# Patient Record
Sex: FEMALE | Race: WHITE | NOT HISPANIC OR LATINO | Employment: FULL TIME | URBAN - METROPOLITAN AREA
[De-identification: names, ages, dates, MRNs, and addresses within clinical notes are randomized per-mention and may not be internally consistent; named-entity substitution may affect disease eponyms.]

---

## 2018-10-03 ENCOUNTER — OFFICE VISIT (OUTPATIENT)
Dept: INTERNAL MEDICINE | Facility: CLINIC | Age: 23
End: 2018-10-03
Payer: COMMERCIAL

## 2018-10-03 ENCOUNTER — LAB VISIT (OUTPATIENT)
Dept: LAB | Facility: HOSPITAL | Age: 23
End: 2018-10-03
Attending: FAMILY MEDICINE
Payer: COMMERCIAL

## 2018-10-03 VITALS
SYSTOLIC BLOOD PRESSURE: 128 MMHG | WEIGHT: 136.69 LBS | DIASTOLIC BLOOD PRESSURE: 84 MMHG | BODY MASS INDEX: 23.46 KG/M2 | OXYGEN SATURATION: 98 % | TEMPERATURE: 99 F | HEART RATE: 105 BPM

## 2018-10-03 DIAGNOSIS — F51.05 INSOMNIA SECONDARY TO ANXIETY: ICD-10-CM

## 2018-10-03 DIAGNOSIS — R19.7 INTERMITTENT DIARRHEA: ICD-10-CM

## 2018-10-03 DIAGNOSIS — K59.09 INTERMITTENT CONSTIPATION: Primary | ICD-10-CM

## 2018-10-03 DIAGNOSIS — K59.09 INTERMITTENT CONSTIPATION: ICD-10-CM

## 2018-10-03 DIAGNOSIS — R19.5 ABNORMAL STOOL COLOR: ICD-10-CM

## 2018-10-03 DIAGNOSIS — F41.9 INSOMNIA SECONDARY TO ANXIETY: ICD-10-CM

## 2018-10-03 PROBLEM — F32.A DEPRESSION: Status: RESOLVED | Noted: 2018-10-03 | Resolved: 2018-10-03

## 2018-10-03 PROBLEM — F90.9 ADHD (ATTENTION DEFICIT HYPERACTIVITY DISORDER): Status: ACTIVE | Noted: 2018-10-03

## 2018-10-03 PROBLEM — F32.A DEPRESSION: Status: ACTIVE | Noted: 2018-10-03

## 2018-10-03 PROBLEM — F90.9 ADHD (ATTENTION DEFICIT HYPERACTIVITY DISORDER): Status: RESOLVED | Noted: 2018-10-03 | Resolved: 2018-10-03

## 2018-10-03 LAB
ALBUMIN SERPL BCP-MCNC: 4.3 G/DL
ALP SERPL-CCNC: 78 U/L
ALT SERPL W/O P-5'-P-CCNC: 10 U/L
ANION GAP SERPL CALC-SCNC: 10 MMOL/L
AST SERPL-CCNC: 17 U/L
BASOPHILS # BLD AUTO: 0.03 K/UL
BASOPHILS NFR BLD: 0.6 %
BILIRUB SERPL-MCNC: 0.5 MG/DL
BUN SERPL-MCNC: 11 MG/DL
CALCIUM SERPL-MCNC: 10.1 MG/DL
CHLORIDE SERPL-SCNC: 107 MMOL/L
CO2 SERPL-SCNC: 24 MMOL/L
CREAT SERPL-MCNC: 0.8 MG/DL
DIFFERENTIAL METHOD: ABNORMAL
EOSINOPHIL # BLD AUTO: 0 K/UL
EOSINOPHIL NFR BLD: 0.7 %
ERYTHROCYTE [DISTWIDTH] IN BLOOD BY AUTOMATED COUNT: 12 %
EST. GFR  (AFRICAN AMERICAN): >60 ML/MIN/1.73 M^2
EST. GFR  (NON AFRICAN AMERICAN): >60 ML/MIN/1.73 M^2
GLUCOSE SERPL-MCNC: 60 MG/DL
HCT VFR BLD AUTO: 41 %
HGB BLD-MCNC: 13.6 G/DL
IMM GRANULOCYTES # BLD AUTO: 0.01 K/UL
IMM GRANULOCYTES NFR BLD AUTO: 0.2 %
LYMPHOCYTES # BLD AUTO: 1.6 K/UL
LYMPHOCYTES NFR BLD: 30 %
MCH RBC QN AUTO: 31.9 PG
MCHC RBC AUTO-ENTMCNC: 33.2 G/DL
MCV RBC AUTO: 96 FL
MONOCYTES # BLD AUTO: 0.3 K/UL
MONOCYTES NFR BLD: 6.3 %
NEUTROPHILS # BLD AUTO: 3.4 K/UL
NEUTROPHILS NFR BLD: 62.2 %
NRBC BLD-RTO: 0 /100 WBC
PLATELET # BLD AUTO: 241 K/UL
PMV BLD AUTO: 12.1 FL
POTASSIUM SERPL-SCNC: 4.7 MMOL/L
PROT SERPL-MCNC: 7.5 G/DL
RBC # BLD AUTO: 4.26 M/UL
SODIUM SERPL-SCNC: 141 MMOL/L
WBC # BLD AUTO: 5.44 K/UL

## 2018-10-03 PROCEDURE — 99999 PR PBB SHADOW E&M-EST. PATIENT-LVL III: CPT | Mod: PBBFAC,,, | Performed by: FAMILY MEDICINE

## 2018-10-03 PROCEDURE — 36415 COLL VENOUS BLD VENIPUNCTURE: CPT

## 2018-10-03 PROCEDURE — 3008F BODY MASS INDEX DOCD: CPT | Mod: CPTII,S$GLB,, | Performed by: FAMILY MEDICINE

## 2018-10-03 PROCEDURE — 99214 OFFICE O/P EST MOD 30 MIN: CPT | Mod: S$GLB,,, | Performed by: FAMILY MEDICINE

## 2018-10-03 PROCEDURE — 80053 COMPREHEN METABOLIC PANEL: CPT

## 2018-10-03 PROCEDURE — 85025 COMPLETE CBC W/AUTO DIFF WBC: CPT

## 2018-10-03 NOTE — PROGRESS NOTES
"Subjective:       Patient ID: Catherine Ordaz is a 23 y.o. female here today to establish care.    Chief Complaint: Establish Care; GI Problem; and Insomnia    Patient presents to clinic today to establish care. Patient reports stomach problems for 2 years that are stable. Reports she goes back and forth between constipation and diarrhea. She also reports concerning changes in stool color. She reports having bright green stools, portia colored stools and black stools at times. No red blood. She reports nausea at times, no vomiting. No fever/chills.       Review of Systems   Constitutional: Positive for unexpected weight change (15 pound weight gain over last 3-4  years). Negative for activity change.   HENT: Positive for hearing loss (left ear x 4 years). Negative for rhinorrhea and trouble swallowing.    Eyes: Negative for discharge and visual disturbance.   Respiratory: Negative for chest tightness and wheezing.    Cardiovascular: Negative for chest pain and palpitations.   Gastrointestinal: Positive for constipation and diarrhea. Negative for blood in stool and vomiting.   Endocrine: Negative for polydipsia and polyuria.   Genitourinary: Positive for menstrual problem. Negative for difficulty urinating, dysuria and hematuria.   Musculoskeletal: Positive for neck pain. Negative for arthralgias and joint swelling.   Neurological: Negative for weakness and headaches.   Psychiatric/Behavioral: Positive for confusion (reports will forget what she intends to do or means "fridge" but says "washer") and sleep disturbance (reports trouble sleeping). Negative for dysphoric mood. The patient is nervous/anxious (anxiety due to stress).        Objective:      Physical Exam   Constitutional: She is oriented to person, place, and time. She appears well-developed and well-nourished. No distress.   HENT:   Head: Normocephalic and atraumatic.   Eyes: Conjunctivae and EOM are normal. Pupils are equal, round, and reactive to " light. No scleral icterus.   Cardiovascular: Normal rate and regular rhythm. Exam reveals no gallop and no friction rub.   No murmur heard.  Pulmonary/Chest: Effort normal and breath sounds normal.   Abdominal: Soft. Bowel sounds are normal. She exhibits no distension and no mass. There is no hepatosplenomegaly. There is no tenderness.   Neurological: She is alert and oriented to person, place, and time. No cranial nerve deficit. Gait normal.   Psychiatric: She has a normal mood and affect.   Vitals reviewed.      Assessment:       1. Intermittent constipation    2. Intermittent diarrhea    3. Abnormal stool color    4. Insomnia secondary to anxiety        Plan:     Problem List Items Addressed This Visit     Insomnia secondary to anxiety    Current Assessment & Plan     Advised daily guided meditation to lower anxiety and stress levels; given handouts on stress management and insomnia; will reassess next visit and consider medication if needed           Other Visit Diagnoses     Intermittent constipation    -  Primary    Relevant Orders    Comprehensive metabolic panel    CBC auto differential    Ambulatory referral to Gastroenterology    Intermittent diarrhea        Relevant Orders    Comprehensive metabolic panel    CBC auto differential    Ambulatory referral to Gastroenterology    Abnormal stool color        Relevant Orders    Comprehensive metabolic panel    CBC auto differential    Ambulatory referral to Gastroenterology          Declines flu vaccine today. Will address other health maintenance at visit in 2 months.  Female health screenings per OB/GYN- Dr. Genet Hinds, Release signed for records.  .

## 2018-10-03 NOTE — ASSESSMENT & PLAN NOTE
Advised daily guided meditation to lower anxiety and stress levels; given handouts on stress management and insomnia; will reassess next visit and consider medication if needed

## 2018-10-03 NOTE — PATIENT INSTRUCTIONS
Try 3-5 minutes daily of guided meditation  Headspace - has free 10 day introduction  Simply Being  Calm  Simple Habit      Treating Insomnia  Good sleeping habits are a key part of treatment. If needed, some medications may help you sleep better at first. Making healthy lifestyle changes and learning to relax can improve your sleep. Treating insomnia takes commitment, but trust that your efforts will pay off. Talk to your health care provider before taking any medication.    Healthy Lifestyle  Your lifestyle affects your health and your sleep. Here are some healthy habits:  · Keep a regular sleep schedule. Go to bed and get up at the same time each day.  · Exercise regularly. It may help you reduce stress. Avoid strenuous exercise for two to four hours before bedtime.  · Avoid or limit naps.  · Use your bed only for sleep and sex.  · Dont spend too much time in bed trying to fall asleep. If you cant fall asleep, get up and do something until you become tired and drowsy.  · Avoid or limit caffeine and nicotine. They can keep you awake at night. Also avoid alcohol. It may help you fall asleep at first, but your sleep will not be restful.  Before Bedtime  To sleep better every night, try these tips:  · Have a bedtime routine to let your body and mind know when its time to sleep.  · Think of going to bed as relaxing and enjoyable. Sleep will come sooner.  · If your worries dont let you sleep, write them down in a diary. Then close it, and go to bed.  · Make sure the room is not too hot or too cold. If its not dark enough, an eye mask can help. If its noisy, try using earplugs.  Learn to Relax  Stress, anxiety, and body tension may keep you awake at night. To unwind before bedtime, try a warm bath, meditation, or yoga. Also, try the following:  · Deep breathing. Sit or lie back in a chair. Take a slow, deep breath. Hold it for 5 counts. Then breathe out slowly through your mouth. Keep doing this until you feel  "relaxed.  · Progressive muscle relaxation. Tense and then relax the muscles in your body as you breathe deeply. Start with your feet and work up your body to your neck and face.  © 2586-2872 Bert Kaur, [address] [place] [address]. All rights reserved. This information is not intended as a substitute for professional medical care. Always follow your healthcare professional's instructions.    Identifying Causes of Stress  Things that cause stress (stressors) can be everyday events, major life changes, or a combination of things. They can be either happy or sad events. Knowing your stressors will help you find ways to manage your stress.    Minor Hassles  Daily life is filled with little annoyances. Spilled milk, lost keys, a missed phone call. These are rarely earth-shattering events. But the stress they cause can build up over time. Minor hassles also seem more painful if you're under long-term stress.  Major Changes  A move, a divorce, or the loss of a loved one are major changes. They require you to adapt to a new lifestyle. You may fear an unknown future or worry about whether you'll be able to cope. Even positive events, like marriage or the birth of a baby, can cause major stress.    Stress Overload  Being pulled in many directions can be exhausting--especially when you're juggling work and family. Working late, taking kids to soccer, paying bills, and buying groceries may feel completely overwhelming. For a time, life can seem totally out of control.  Feeling Helpless  Feeling helpless is a sign of long-term stress. You may feel like you have no control over your life. Even a faraway disaster told on the evening news may seem like it's part of your own troubles. Over time, these feelings may lead to depression. If you feel "down" for weeks, talk with your doctor or a counselor. Depression can be treated.  © 5621-3416 Bert Kaur, [address] [place] [address]. All rights " reserved. This information is not intended as a substitute for professional medical care. Always follow your healthcare professional's instructions.  Keys to Managing Stress  There are several keys to managing stress. First, learn to recognize when youre under stress and what triggers it. Next, find positive ways of responding to your triggers. Be sure to take good care of your health and make time to relax. Read on to learn more about the keys to managing stress.  Recognizing Stress  Learn to recognize your stress and find out what triggers it. To do this, try to be aware of how you feel each day. If you notice your heart racing or your muscles tightening, your body may be responding to stress. Ask yourself why. Then write down your answer. To keep the process going, make a list of all the things that trigger stressful feelings.  Living a Healthy Life  Keeping yourself healthy helps you deal better with stress. This means getting enough sleep, eating right, and exercising. It also means knowing what you value most in life, and making time for yourself. Keep a daily health journal to see if youre doing these things. Then, read your journal each week. If you dont take good care of yourself, you may feel more stressed.    Responding Better to Stress  Life is full of stressors that you cant control. But you can learn more positive ways of responding to them. This will help you feel more in control. To begin, try this tip: Think about how much effort you want to put into dealing with a certain stressor. Do you really need to handle that stressor? If so, decide on the best way to do this. Change what you can. But if the stressor isnt important, or if its out of your control, then why worry about it?  Relaxing to Slow Down  Relaxing can help you prevent or relieve stressful feelings. This tip may also help: When youre facing a stressor, pause for a moment. Then take a deep breath and slowly breathe out as you count  to 10. This will help clear your mind so you can respond to stress better.  © 0367-4443 Bert Kaur, [address] [place] [address], [place] [address]. All rights reserved. This information is not intended as a substitute for professional medical care. Always follow your healthcare professional's instructions.

## 2018-10-12 ENCOUNTER — PATIENT MESSAGE (OUTPATIENT)
Dept: GASTROENTEROLOGY | Facility: CLINIC | Age: 23
End: 2018-10-12

## 2018-10-12 ENCOUNTER — OFFICE VISIT (OUTPATIENT)
Dept: GASTROENTEROLOGY | Facility: CLINIC | Age: 23
End: 2018-10-12
Payer: COMMERCIAL

## 2018-10-12 VITALS
DIASTOLIC BLOOD PRESSURE: 60 MMHG | SYSTOLIC BLOOD PRESSURE: 110 MMHG | BODY MASS INDEX: 24.06 KG/M2 | HEIGHT: 63 IN | WEIGHT: 135.81 LBS | HEART RATE: 64 BPM

## 2018-10-12 DIAGNOSIS — R11.0 NAUSEA: ICD-10-CM

## 2018-10-12 DIAGNOSIS — R19.5 ABNORMAL STOOL COLOR: ICD-10-CM

## 2018-10-12 DIAGNOSIS — R10.9 ABDOMINAL PAIN, UNSPECIFIED ABDOMINAL LOCATION: ICD-10-CM

## 2018-10-12 DIAGNOSIS — K59.04 CHRONIC IDIOPATHIC CONSTIPATION: Primary | ICD-10-CM

## 2018-10-12 PROCEDURE — 99204 OFFICE O/P NEW MOD 45 MIN: CPT | Mod: S$GLB,,, | Performed by: NURSE PRACTITIONER

## 2018-10-12 PROCEDURE — 99999 PR PBB SHADOW E&M-EST. PATIENT-LVL III: CPT | Mod: PBBFAC,,, | Performed by: NURSE PRACTITIONER

## 2018-10-12 PROCEDURE — 3008F BODY MASS INDEX DOCD: CPT | Mod: CPTII,S$GLB,, | Performed by: NURSE PRACTITIONER

## 2018-10-12 NOTE — PROGRESS NOTES
Clinic Consult:  [facility] Consultation Note    Reason for Consult:  The primary encounter diagnosis was Chronic idiopathic constipation. Diagnoses of Abnormal stool color, Abdominal pain, unspecified abdominal location, and Nausea were also pertinent to this visit.    PCP: Antonette Quinteros   [address] [place] [address]  / [place] [id number]    HPI:  This is a 23 y.o. female here for evaluation of the above. She was referred to me by Dr. Quinteros. She reports having chronic bowel issues for the last few years. She has constipation. She will sometimes go a couple of weeks without having a bowel movement. When she goes she states it just does not look normal. She admits hat the color of her stools vary in color and my be different colors. No hematochezia or melena. She has tried high fiber, Miralax, probiotic drinks. She denies any family history of colon cancer and IBD.     Review of Systems   Constitutional: Negative for fever, malaise/fatigue and weight loss.   HENT: Negative for sore throat.    Respiratory: Negative for cough and wheezing.    Cardiovascular: Negative for chest pain and palpitations.   Gastrointestinal: Positive for abdominal pain, constipation and diarrhea. Negative for blood in stool, heartburn, melena, nausea and vomiting.   Genitourinary: Negative for dysuria and frequency.   Musculoskeletal: Negative for back pain, joint pain, myalgias and neck pain.   Skin: Negative for itching and rash.   Neurological: Negative for dizziness, speech change, seizures, loss of consciousness and headaches.   Psychiatric/Behavioral: Negative for depression and substance abuse. The patient is not nervous/anxious.        Medical History:  has a past medical history of ADHD (attention deficit hyperactivity disorder) (10/3/2018), Anxiety, Depression, and H/O varicella.    Surgical History:  has no past surgical history on file.    Family History: family history includes No Known Problems in her father  "and mother..     Social History:  reports that she has quit smoking. she has never used smokeless tobacco. She reports that she drinks alcohol. She reports that she does not use drugs.    Allergies: Reviewed    Home Medications:   No current outpatient medications on file prior to visit.     No current facility-administered medications on file prior to visit.        Physical Exam:  /60   Pulse 64   Ht 5' 3" (1.6 m)   Wt 61.6 kg (135 lb 12.9 oz)   BMI 24.06 kg/m²   Body mass index is 24.06 kg/m².  Physical Exam   Constitutional: She is oriented to person, place, and time and well-developed, well-nourished, and in no distress. No distress.   HENT:   Head: Normocephalic.   Eyes: Conjunctivae are normal. Pupils are equal, round, and reactive to light.   Cardiovascular: Normal rate, regular rhythm and normal heart sounds.   Pulmonary/Chest: Effort normal and breath sounds normal. No respiratory distress.   Abdominal: Soft. Bowel sounds are normal. She exhibits no distension. There is no tenderness.   Neurological: She is alert and oriented to person, place, and time. No cranial nerve deficit.   Skin: Skin is warm and dry. No rash noted.   Psychiatric: Mood and affect normal.       Labs: Pertinent labs reviewed.    Assessment:  1. Chronic idiopathic constipation    2. Abnormal stool color    3. Abdominal pain, unspecified abdominal location    4. Nausea         Recommendations:  - start with initial clean out with magnesium citrate.  - then start Miralax daily   - start Probiotics     Follow-up if symptoms worsen or fail to improve.    Thank you so much for allowing me to participate in the care [facility]  "

## 2018-10-12 NOTE — LETTER
October 12, 2018      Antonette Quinteros MD  [address] [place] [address] [address] [place] [id number]           [facility]  [address] [place] [address] [address] [address]  Phone: [phone]  Fax: [phone]          Patient: Catherine Ordaz   MR Number: [id number]   YOB: 1995   Date of Visit: 10/12/2018       Dear Dr. Antonette Quinteros:    Thank you for referring Catherine Ordaz to me for evaluation. Attached you will find relevant portions of my assessment and plan of care.    If you have questions, please do not hesitate to call me. I look forward to following Catherine Ordaz along with you.    Sincerely,    Olivia Maravilla, NP    Enclosure  CC:  No Recipients    If you would like to receive this communication electronically, please contact [email or web address] or [phone] to request more information on [facility] access.    For providers and/or their staff who would like to refer a patient to [facility], please contact us through our one-stop-shop provider referral line, [facility] , at [phone].    If you feel you have received this communication in error or would no longer like to receive these types of communications, please e-mail [email or web address]

## 2018-10-12 NOTE — PATIENT INSTRUCTIONS
Start with an initial clean out with 1 bottle of Magnesium Citrate. This can be purchased over the counter. After completed, start Miralax once to twice a day. This can also be purchased over the counter. Also, you should drink plenty of water a day and incorporate dietary fiber in your diet, such as fruits, vegetables, and fortified cereals. Also start probiotics such as Culturelle or Florastor.

## 2019-03-07 ENCOUNTER — PATIENT MESSAGE (OUTPATIENT)
Dept: INTERNAL MEDICINE | Facility: CLINIC | Age: 24
End: 2019-03-07

## 2019-12-17 ENCOUNTER — PATIENT MESSAGE (OUTPATIENT)
Dept: INTERNAL MEDICINE | Facility: CLINIC | Age: 24
End: 2019-12-17

## 2019-12-30 ENCOUNTER — PATIENT MESSAGE (OUTPATIENT)
Dept: INTERNAL MEDICINE | Facility: CLINIC | Age: 24
End: 2019-12-30

## 2019-12-31 ENCOUNTER — OFFICE VISIT (OUTPATIENT)
Dept: INTERNAL MEDICINE | Facility: CLINIC | Age: 24
End: 2019-12-31
Payer: COMMERCIAL

## 2019-12-31 VITALS
DIASTOLIC BLOOD PRESSURE: 78 MMHG | HEART RATE: 98 BPM | OXYGEN SATURATION: 100 % | SYSTOLIC BLOOD PRESSURE: 126 MMHG | TEMPERATURE: 99 F | WEIGHT: 130.94 LBS | HEIGHT: 64 IN | BODY MASS INDEX: 22.35 KG/M2

## 2019-12-31 DIAGNOSIS — R05.9 COUGH: Primary | ICD-10-CM

## 2019-12-31 DIAGNOSIS — J06.9 VIRAL UPPER RESPIRATORY TRACT INFECTION: ICD-10-CM

## 2019-12-31 PROCEDURE — 90471 IMMUNIZATION ADMIN: CPT | Mod: S$GLB,,, | Performed by: FAMILY MEDICINE

## 2019-12-31 PROCEDURE — 96372 THER/PROPH/DIAG INJ SC/IM: CPT | Mod: 59,S$GLB,, | Performed by: FAMILY MEDICINE

## 2019-12-31 PROCEDURE — 90686 IIV4 VACC NO PRSV 0.5 ML IM: CPT | Mod: S$GLB,,, | Performed by: FAMILY MEDICINE

## 2019-12-31 PROCEDURE — 3008F PR BODY MASS INDEX (BMI) DOCUMENTED: ICD-10-PCS | Mod: CPTII,S$GLB,, | Performed by: FAMILY MEDICINE

## 2019-12-31 PROCEDURE — 96372 PR INJECTION,THERAP/PROPH/DIAG2ST, IM OR SUBCUT: ICD-10-PCS | Mod: 59,S$GLB,, | Performed by: FAMILY MEDICINE

## 2019-12-31 PROCEDURE — 3008F BODY MASS INDEX DOCD: CPT | Mod: CPTII,S$GLB,, | Performed by: FAMILY MEDICINE

## 2019-12-31 PROCEDURE — 90471 FLU VACCINE (QUAD) GREATER THAN OR EQUAL TO 3YO PRESERVATIVE FREE IM: ICD-10-PCS | Mod: S$GLB,,, | Performed by: FAMILY MEDICINE

## 2019-12-31 PROCEDURE — 90686 FLU VACCINE (QUAD) GREATER THAN OR EQUAL TO 3YO PRESERVATIVE FREE IM: ICD-10-PCS | Mod: S$GLB,,, | Performed by: FAMILY MEDICINE

## 2019-12-31 PROCEDURE — 99213 OFFICE O/P EST LOW 20 MIN: CPT | Mod: 25,S$GLB,, | Performed by: FAMILY MEDICINE

## 2019-12-31 PROCEDURE — 99999 PR PBB SHADOW E&M-EST. PATIENT-LVL III: CPT | Mod: PBBFAC,,, | Performed by: FAMILY MEDICINE

## 2019-12-31 PROCEDURE — 99999 PR PBB SHADOW E&M-EST. PATIENT-LVL III: ICD-10-PCS | Mod: PBBFAC,,, | Performed by: FAMILY MEDICINE

## 2019-12-31 PROCEDURE — 99213 PR OFFICE/OUTPT VISIT, EST, LEVL III, 20-29 MIN: ICD-10-PCS | Mod: 25,S$GLB,, | Performed by: FAMILY MEDICINE

## 2019-12-31 RX ORDER — BETAMETHASONE SODIUM PHOSPHATE AND BETAMETHASONE ACETATE 3; 3 MG/ML; MG/ML
9 INJECTION, SUSPENSION INTRA-ARTICULAR; INTRALESIONAL; INTRAMUSCULAR; SOFT TISSUE
Status: COMPLETED | OUTPATIENT
Start: 2019-12-31 | End: 2019-12-31

## 2019-12-31 RX ORDER — BENZONATATE 200 MG/1
200 CAPSULE ORAL 3 TIMES DAILY PRN
Qty: 30 CAPSULE | Refills: 1 | Status: SHIPPED | OUTPATIENT
Start: 2019-12-31 | End: 2020-01-10

## 2019-12-31 RX ADMIN — BETAMETHASONE SODIUM PHOSPHATE AND BETAMETHASONE ACETATE 9 MG: 3; 3 INJECTION, SUSPENSION INTRA-ARTICULAR; INTRALESIONAL; INTRAMUSCULAR; SOFT TISSUE at 09:12

## 2019-12-31 NOTE — PROGRESS NOTES
Catherine Ordaz  12/31/2019  [id number]    Antonette Quinteros MD  Patient Care Team:  Antonette Quinteros MD as PCP - General (Family Medicine)  Bianca Borges NP as Nurse Practitioner (Internal Medicine)  Genet Carpenter MD (Obstetrics and Gynecology)  Roxanna Betancur LPN as Care Coordinator (Internal Medicine)  Genet Carpenter MD (Obstetrics and Gynecology)        Chief Complaint:  Chief Complaint   Patient presents with    Cough    Sinus Problem       History of Present Illness:   Catherine Ordaz 24 y.o. female  states she had the flu about 2 weeks ago with cough fever chills body aches but was not treated by physician but rather took over-the-counter medicine to she felt better.    She says she has not had fever in over a week but continues to have a cough of yellowish-green mucus and some sneezing and sinus congestion.  No body aches no systemic symptoms and no fever recently.    History:  Past Medical History:   Diagnosis Date    ADHD (attention deficit hyperactivity disorder) 10/3/2018    Anxiety     Depression     H/O varicella     child     No past surgical history on file.  Family History   Problem Relation Age of Onset    No Known Problems Mother     No Known Problems Father      Social History     Socioeconomic History    Marital status: Single     Spouse name: Not on file    Number of children: Not on file    Years of education: Not on file    Highest education level: Not on file   Occupational History    Not on file   Social Needs    Financial resource strain: Not on file    Food insecurity:     Worry: Not on file     Inability: Not on file    Transportation needs:     Medical: Not on file     Non-medical: Not on file   Tobacco Use    Smoking status: Former Smoker    Smokeless tobacco: Never Used   Substance and Sexual Activity    Alcohol use: Yes     Alcohol/week: 0.0 standard drinks    Drug use: No    Sexual activity: Not on file   Lifestyle    Physical  activity:     Days per week: Not on file     Minutes per session: Not on file    Stress: Not on file   Relationships    Social connections:     Talks on phone: Not on file     Gets together: Not on file     Attends Caodaism service: Not on file     Active member of club or organization: Not on file     Attends meetings of clubs or organizations: Not on file     Relationship status: Not on file   Other Topics Concern    Not on file   Social History Narrative    Not on file     Patient Active Problem List   Diagnosis    Insomnia secondary to anxiety    Viral upper respiratory tract infection    Cough     Review of patient's allergies indicates:   Allergen Reactions    Aspirin Anxiety, Nausea Only and Shortness Of Breath       The following were reviewed at this visit: active problem list, medication list, allergies, family history, social history, and health maintenance.    Medications:  No current outpatient medications on file prior to visit.     No current facility-administered medications on file prior to visit.        Medications have been reviewed and reconciled with patient at this visit.      Exam:  Wt Readings from Last 3 Encounters:   12/31/19 59.4 kg (130 lb 15.3 oz)   10/12/18 61.6 kg (135 lb 12.9 oz)   10/03/18 62 kg (136 lb 11 oz)     Temp Readings from Last 3 Encounters:   12/31/19 98.5 °F (36.9 °C) (Tympanic)   10/03/18 99.3 °F (37.4 °C) (Tympanic)   04/15/16 100 °F (37.8 °C) (Tympanic)     BP Readings from Last 3 Encounters:   12/31/19 126/78   10/12/18 110/60   10/03/18 128/84     Pulse Readings from Last 3 Encounters:   12/31/19 98   10/12/18 64   10/03/18 105     Body mass index is 22.48 kg/m².      Review of Systems   Constitutional: Negative for chills, fever and weight loss.   Respiratory: Negative for shortness of breath.    Cardiovascular: Negative for chest pain and palpitations.     Physical Exam   Constitutional: She is oriented to person, place, and time. She appears  well-developed and well-nourished. No distress.   HENT:   Head: Normocephalic and atraumatic.   Right Ear: External ear normal.   Left Ear: External ear normal.   Heavy white and yellow postnasal drip noted in pharynx.    Neck supple    No palpable neck nodes   Eyes: Pupils are equal, round, and reactive to light. EOM are normal. Right eye exhibits no discharge. Left eye exhibits no discharge.   Neck: Normal range of motion. Neck supple. No thyromegaly present.   Cardiovascular: Normal rate, regular rhythm and normal heart sounds. Exam reveals no gallop and no friction rub.   No murmur heard.  Pulmonary/Chest: Effort normal and breath sounds normal. No respiratory distress. She has no wheezes. She has no rales.   Abdominal: Soft. She exhibits no distension. There is no tenderness.   Musculoskeletal: Normal range of motion.   Neurological: She is alert and oriented to person, place, and time.   Psychiatric: She has a normal mood and affect.   Vitals reviewed.    WNWD, A&O      Catherine was seen today for cough and sinus problem.    Diagnoses and all orders for this visit:    Cough    Viral upper respiratory tract infection    Other orders  -     betamethasone acetate-betamethasone sodium phosphate injection 9 mg  -     benzonatate (TESSALON) 200 MG capsule; Take 1 capsule (200 mg total) by mouth 3 (three) times daily as needed.     I have offered the patient a flu shot today since she has not had fever in over a week and does not have systemic symptoms.  She would like to receive the injection today.    The patient verbalized good understanding of the medical issues discussed today and expressed appreciation for the care provided.  Patient was given the opportunity to ask questions and be an active participant in their medical care. Patient had no further questions or concerns at this time.   The patient was encouraged to participate in appropriate physical activity.    After visit summary was printed and given to  patient upon discharge today.  Patient goals and care plan are included in After Visit Summary.    This note was produced with voice recognition software and may have sound a like errors

## 2019-12-31 NOTE — PATIENT INSTRUCTIONS
Cortisone injection today for sinus drainage and cough    Flu vaccine injection today    Consider Allegra over-the-counter daily for the next week or 2 to control postnasal drip sinus drainage and stay well hydrated    Take benzonatate up to 3 times a day to control cough

## 2021-01-18 ENCOUNTER — PATIENT MESSAGE (OUTPATIENT)
Dept: INTERNAL MEDICINE | Facility: CLINIC | Age: 26
End: 2021-01-18

## 2021-03-21 ENCOUNTER — PATIENT MESSAGE (OUTPATIENT)
Dept: INTERNAL MEDICINE | Facility: CLINIC | Age: 26
End: 2021-03-21

## 2021-03-22 ENCOUNTER — OFFICE VISIT (OUTPATIENT)
Dept: INTERNAL MEDICINE | Facility: CLINIC | Age: 26
End: 2021-03-22
Payer: COMMERCIAL

## 2021-03-22 ENCOUNTER — PATIENT MESSAGE (OUTPATIENT)
Dept: INTERNAL MEDICINE | Facility: CLINIC | Age: 26
End: 2021-03-22

## 2021-03-22 DIAGNOSIS — Z29.89 ALTITUDE SICKNESS PROPHYLAXIS: Primary | ICD-10-CM

## 2021-03-22 PROCEDURE — 99214 OFFICE O/P EST MOD 30 MIN: CPT | Mod: 95,,, | Performed by: FAMILY MEDICINE

## 2021-03-22 PROCEDURE — 99214 PR OFFICE/OUTPT VISIT, EST, LEVL IV, 30-39 MIN: ICD-10-PCS | Mod: 95,,, | Performed by: FAMILY MEDICINE

## 2021-03-23 PROBLEM — R05.9 COUGH: Status: RESOLVED | Noted: 2019-12-31 | Resolved: 2021-03-23

## 2021-03-23 PROBLEM — J06.9 VIRAL UPPER RESPIRATORY TRACT INFECTION: Status: RESOLVED | Noted: 2019-12-31 | Resolved: 2021-03-23

## 2021-03-23 RX ORDER — ACETAZOLAMIDE 125 MG/1
125 TABLET ORAL 2 TIMES DAILY
Qty: 16 TABLET | Refills: 0 | Status: SHIPPED | OUTPATIENT
Start: 2021-03-23 | End: 2021-10-19

## 2021-04-22 ENCOUNTER — PATIENT MESSAGE (OUTPATIENT)
Dept: ADMINISTRATIVE | Facility: HOSPITAL | Age: 26
End: 2021-04-22

## 2021-04-28 ENCOUNTER — PATIENT MESSAGE (OUTPATIENT)
Dept: RESEARCH | Facility: HOSPITAL | Age: 26
End: 2021-04-28

## 2021-04-28 ENCOUNTER — PATIENT OUTREACH (OUTPATIENT)
Dept: ADMINISTRATIVE | Facility: HOSPITAL | Age: 26
End: 2021-04-28

## 2021-10-06 ENCOUNTER — PATIENT MESSAGE (OUTPATIENT)
Dept: INTERNAL MEDICINE | Facility: CLINIC | Age: 26
End: 2021-10-06

## 2021-10-06 DIAGNOSIS — N30.00 ACUTE CYSTITIS WITHOUT HEMATURIA: Primary | ICD-10-CM

## 2021-10-06 RX ORDER — NITROFURANTOIN 25; 75 MG/1; MG/1
100 CAPSULE ORAL 2 TIMES DAILY
Qty: 10 CAPSULE | Refills: 0 | Status: SHIPPED | OUTPATIENT
Start: 2021-10-06 | End: 2021-10-11

## 2021-10-19 ENCOUNTER — OFFICE VISIT (OUTPATIENT)
Dept: INTERNAL MEDICINE | Facility: CLINIC | Age: 26
End: 2021-10-19
Payer: COMMERCIAL

## 2021-10-19 ENCOUNTER — PATIENT MESSAGE (OUTPATIENT)
Dept: INTERNAL MEDICINE | Facility: CLINIC | Age: 26
End: 2021-10-19

## 2021-10-19 DIAGNOSIS — J06.9 VIRAL URI WITH COUGH: Primary | ICD-10-CM

## 2021-10-19 PROCEDURE — 99214 OFFICE O/P EST MOD 30 MIN: CPT | Mod: 95,,, | Performed by: FAMILY MEDICINE

## 2021-10-19 PROCEDURE — 99214 PR OFFICE/OUTPT VISIT, EST, LEVL IV, 30-39 MIN: ICD-10-PCS | Mod: 95,,, | Performed by: FAMILY MEDICINE

## 2021-10-19 RX ORDER — PROMETHAZINE HYDROCHLORIDE AND DEXTROMETHORPHAN HYDROBROMIDE 6.25; 15 MG/5ML; MG/5ML
5 SYRUP ORAL EVERY 8 HOURS PRN
Qty: 118 ML | Refills: 0 | Status: SHIPPED | OUTPATIENT
Start: 2021-10-19 | End: 2021-10-29

## 2021-10-19 RX ORDER — NORGESTIMATE AND ETHINYL ESTRADIOL 7DAYSX3 28
KIT ORAL
COMMUNITY
End: 2021-12-06

## 2021-10-19 RX ORDER — MEDROXYPROGESTERONE ACETATE 150 MG/ML
1 INJECTION, SUSPENSION INTRAMUSCULAR
COMMUNITY
End: 2021-12-06

## 2021-10-20 ENCOUNTER — LAB VISIT (OUTPATIENT)
Dept: PRIMARY CARE CLINIC | Facility: OTHER | Age: 26
End: 2021-10-20
Attending: INTERNAL MEDICINE
Payer: COMMERCIAL

## 2021-10-20 DIAGNOSIS — Z20.822 ENCOUNTER FOR LABORATORY TESTING FOR COVID-19 VIRUS: ICD-10-CM

## 2021-10-20 LAB
SARS-COV-2 RNA RESP QL NAA+PROBE: NOT DETECTED
SARS-COV-2- CYCLE NUMBER: NORMAL

## 2021-10-20 PROCEDURE — U0003 INFECTIOUS AGENT DETECTION BY NUCLEIC ACID (DNA OR RNA); SEVERE ACUTE RESPIRATORY SYNDROME CORONAVIRUS 2 (SARS-COV-2) (CORONAVIRUS DISEASE [COVID-19]), AMPLIFIED PROBE TECHNIQUE, MAKING USE OF HIGH THROUGHPUT TECHNOLOGIES AS DESCRIBED BY CMS-2020-01-R: HCPCS | Performed by: INTERNAL MEDICINE

## 2021-12-06 ENCOUNTER — PATIENT MESSAGE (OUTPATIENT)
Dept: INTERNAL MEDICINE | Facility: CLINIC | Age: 26
End: 2021-12-06

## 2021-12-06 ENCOUNTER — OFFICE VISIT (OUTPATIENT)
Dept: INTERNAL MEDICINE | Facility: CLINIC | Age: 26
End: 2021-12-06
Payer: COMMERCIAL

## 2021-12-06 VITALS
DIASTOLIC BLOOD PRESSURE: 76 MMHG | BODY MASS INDEX: 22.42 KG/M2 | WEIGHT: 131.31 LBS | OXYGEN SATURATION: 97 % | TEMPERATURE: 98 F | HEIGHT: 64 IN | HEART RATE: 93 BPM | RESPIRATION RATE: 18 BRPM | SYSTOLIC BLOOD PRESSURE: 118 MMHG

## 2021-12-06 DIAGNOSIS — H66.91 RIGHT OTITIS MEDIA, UNSPECIFIED OTITIS MEDIA TYPE: Primary | ICD-10-CM

## 2021-12-06 PROCEDURE — 99999 PR PBB SHADOW E&M-EST. PATIENT-LVL III: ICD-10-PCS | Mod: PBBFAC,,, | Performed by: PHYSICIAN ASSISTANT

## 2021-12-06 PROCEDURE — 99213 PR OFFICE/OUTPT VISIT, EST, LEVL III, 20-29 MIN: ICD-10-PCS | Mod: S$GLB,,, | Performed by: PHYSICIAN ASSISTANT

## 2021-12-06 PROCEDURE — 99999 PR PBB SHADOW E&M-EST. PATIENT-LVL III: CPT | Mod: PBBFAC,,, | Performed by: PHYSICIAN ASSISTANT

## 2021-12-06 PROCEDURE — 99213 OFFICE O/P EST LOW 20 MIN: CPT | Mod: S$GLB,,, | Performed by: PHYSICIAN ASSISTANT

## 2021-12-06 RX ORDER — LORATADINE 10 MG/1
10 TABLET ORAL DAILY
Qty: 30 TABLET | Refills: 0 | COMMUNITY
Start: 2021-12-06 | End: 2022-05-02

## 2021-12-06 RX ORDER — PSEUDOEPHEDRINE HCL 120 MG/1
120 TABLET, FILM COATED, EXTENDED RELEASE ORAL 2 TIMES DAILY
Status: DISCONTINUED | OUTPATIENT
Start: 2021-12-06 | End: 2021-12-06

## 2021-12-06 RX ORDER — PREDNISONE 20 MG/1
20 TABLET ORAL 2 TIMES DAILY
Qty: 10 TABLET | Refills: 0 | Status: SHIPPED | OUTPATIENT
Start: 2021-12-06 | End: 2021-12-11

## 2021-12-06 RX ORDER — AMOXICILLIN AND CLAVULANATE POTASSIUM 875; 125 MG/1; MG/1
1 TABLET, FILM COATED ORAL 2 TIMES DAILY
Qty: 14 TABLET | Refills: 0 | Status: SHIPPED | OUTPATIENT
Start: 2021-12-06 | End: 2021-12-13

## 2021-12-30 ENCOUNTER — PATIENT MESSAGE (OUTPATIENT)
Dept: INTERNAL MEDICINE | Facility: CLINIC | Age: 26
End: 2021-12-30
Payer: COMMERCIAL

## 2022-01-06 ENCOUNTER — LAB VISIT (OUTPATIENT)
Dept: PRIMARY CARE CLINIC | Facility: OTHER | Age: 27
End: 2022-01-06
Attending: INTERNAL MEDICINE
Payer: COMMERCIAL

## 2022-01-06 ENCOUNTER — PATIENT MESSAGE (OUTPATIENT)
Dept: ADMINISTRATIVE | Facility: OTHER | Age: 27
End: 2022-01-06
Payer: COMMERCIAL

## 2022-01-06 DIAGNOSIS — Z20.822 ENCOUNTER FOR LABORATORY TESTING FOR COVID-19 VIRUS: ICD-10-CM

## 2022-01-06 PROCEDURE — U0003 INFECTIOUS AGENT DETECTION BY NUCLEIC ACID (DNA OR RNA); SEVERE ACUTE RESPIRATORY SYNDROME CORONAVIRUS 2 (SARS-COV-2) (CORONAVIRUS DISEASE [COVID-19]), AMPLIFIED PROBE TECHNIQUE, MAKING USE OF HIGH THROUGHPUT TECHNOLOGIES AS DESCRIBED BY CMS-2020-01-R: HCPCS | Mod: ST120 | Performed by: INTERNAL MEDICINE

## 2022-01-10 ENCOUNTER — PATIENT MESSAGE (OUTPATIENT)
Dept: INTERNAL MEDICINE | Facility: CLINIC | Age: 27
End: 2022-01-10
Payer: COMMERCIAL

## 2022-01-13 LAB — SARS-COV-2 RNA RESP QL NAA+PROBE: NOT DETECTED

## 2022-01-15 ENCOUNTER — PATIENT MESSAGE (OUTPATIENT)
Dept: INTERNAL MEDICINE | Facility: CLINIC | Age: 27
End: 2022-01-15
Payer: COMMERCIAL

## 2022-05-02 ENCOUNTER — PATIENT MESSAGE (OUTPATIENT)
Dept: INTERNAL MEDICINE | Facility: CLINIC | Age: 27
End: 2022-05-02

## 2022-05-02 ENCOUNTER — OFFICE VISIT (OUTPATIENT)
Dept: INTERNAL MEDICINE | Facility: CLINIC | Age: 27
End: 2022-05-02
Payer: COMMERCIAL

## 2022-05-02 DIAGNOSIS — R05.9 COUGH: ICD-10-CM

## 2022-05-02 DIAGNOSIS — J01.90 ACUTE BACTERIAL SINUSITIS: Primary | ICD-10-CM

## 2022-05-02 DIAGNOSIS — B96.89 ACUTE BACTERIAL SINUSITIS: Primary | ICD-10-CM

## 2022-05-02 PROCEDURE — 99214 OFFICE O/P EST MOD 30 MIN: CPT | Mod: 95,,, | Performed by: FAMILY MEDICINE

## 2022-05-02 PROCEDURE — 99214 PR OFFICE/OUTPT VISIT, EST, LEVL IV, 30-39 MIN: ICD-10-PCS | Mod: 95,,, | Performed by: FAMILY MEDICINE

## 2022-05-02 RX ORDER — DOXYCYCLINE 100 MG/1
100 CAPSULE ORAL 2 TIMES DAILY
Qty: 14 CAPSULE | Refills: 0 | Status: SHIPPED | OUTPATIENT
Start: 2022-05-02 | End: 2022-05-09

## 2022-05-02 RX ORDER — BENZONATATE 100 MG/1
100-200 CAPSULE ORAL 3 TIMES DAILY PRN
Qty: 30 CAPSULE | Refills: 1 | Status: SHIPPED | OUTPATIENT
Start: 2022-05-02 | End: 2022-05-12

## 2022-05-02 NOTE — PROGRESS NOTES
Subjective:       Patient ID: Catherine Ordaz is a 27 y.o. female.    Chief Complaint: Sinus Problem      The patient location is: home  The chief complaint leading to consultation is: sinus problem    Visit type: audiovisual    Face to Face time with patient: 6 minutes (chart review started 12:44 pm; video started 12:47 pm; video ended 12:53 pm)  12 minutes of total time spent on the encounter, which includes face to face time and non-face to face time preparing to see the patient (eg, review of tests), Obtaining and/or reviewing separately obtained history, Documenting clinical information in the electronic or other health record, Independently interpreting results (not separately reported) and communicating results to the patient/family/caregiver, or Care coordination (not separately reported).     Each patient to whom he or she provides medical services by telemedicine is:  (1) informed of the relationship between the physician and patient and the respective role of any other health care provider with respect to management of the patient; and (2) notified that he or she may decline to receive medical services by telemedicine and may withdraw from such care at any time.    Reports bad URI/sinus issues. Congestion and sore throat. Symptoms started about 1 weeks ago, symptoms getting worse, coughing up green drainage. Reports fever last Wednesday. No chills, CP or SOB. Took covid test on Friday, reports negative.     Sore Throat   This is a recurrent problem. The current episode started in the past 7 days. The problem has been unchanged. Neither side of throat is experiencing more pain than the other. There has been no fever. The fever has been present for less than 1 day. The pain is at a severity of 5/10. The pain is moderate. Associated symptoms include congestion, coughing, diarrhea, ear discharge, a hoarse voice, a plugged ear sensation, neck pain, swollen glands, trouble swallowing and vomiting. Pertinent  [facility]  Comprehensive Daily ICU Note        Bradley Liz MRN# [id number]   Age: 77 year old YOB: 1941     Date of Admission: 7/9/2019    Primary care provider: Alex Mustafa     CODE STATUS: Full    Problem List:   Gangrenous cholecystitis  Septic Shock  Atrial Fibrillation with RVR  Acute Renal Failure   Hypernatremia         Treatment goals for next 24 hours:   Clear liquids  HR control; start scheduled metoprolol IV  Can remove art line/central line if BP remains stable  Start coumadin  Likely transfer out this afternoon        Subjective/ Last 24 hours:   Events: Off pressors since 6 PM. Having bowel movements. Denies pain. Has been up to chair.          Mechanical Ventilation/Vitalsigns/IsandOs:   B/P: 109/68, T: 98.4, P: 151, R: 18    FiO2 (%): 35 %  Resp: 18    IN: 3.1L  OUT: 1525 mL         Physical Examination:   General: Awake, interactive, appears comfortable  HEENT: No icterus  Lungs: Slightly coarse  CVS: Tachy, irregular  Abdomen: Soft, mild distension. Appropriate incisional tenderness. Drain output serous  Extremities/musculoskeletal: No edema  Neurology: Oriented to person. Follows x4. Interactive.   Skin: No breakdown  Psychiatry: Difficult to assess. Flat affect  Exam of Line sites:  RIJ CDI, R radial art site CDI    Hospital Procedures   Laparoscopic cholecystectomy, drain placement  internal jugular CVC placement  Arterial line placement         Feeding/Glucose:   Clears    Blood glucose/Insulin requirement last 24 hours: SSI         Medications:       famotidine  20 mg Intravenous Q12H     heparin  5,000 Units Subcutaneous Q12H     insulin aspart  1-6 Units Subcutaneous Q4H     metoprolol  5 mg Intravenous Q4H     piperacillin-tazobactam  3.375 g Intravenous Q6H     sodium chloride (PF)  10 mL Intracatheter Q8H     sodium chloride (PF)  3 mL Intracatheter Q8H             Labs/Diagnostic studies:   LABS:  Results for BRADLEY LIZ (MRN [id number]) as  negatives include no abdominal pain, drooling, ear pain, headaches, shortness of breath or stridor. She has had no exposure to strep or mono. She has tried oral narcotic analgesics for the symptoms. The treatment provided mild relief.     Review of Systems   HENT: Positive for congestion, ear discharge, hoarse voice, sore throat and trouble swallowing. Negative for drooling and ear pain.    Respiratory: Positive for cough. Negative for shortness of breath and stridor.    Gastrointestinal: Positive for diarrhea and vomiting. Negative for abdominal pain.   Musculoskeletal: Positive for neck pain.   Neurological: Negative for headaches.         Objective:      Physical Exam  Constitutional:       General: She is not in acute distress.     Appearance: She is well-developed.   HENT:      Head: Normocephalic and atraumatic.   Eyes:      General: Lids are normal. No scleral icterus.     Extraocular Movements: Extraocular movements intact.      Conjunctiva/sclera: Conjunctivae normal.   Pulmonary:      Effort: Pulmonary effort is normal.   Neurological:      Mental Status: She is alert and oriented to person, place, and time.   Psychiatric:         Mood and Affect: Mood and affect normal.         Assessment:       1. Acute bacterial sinusitis    2. Cough        Plan:     Problem List Items Addressed This Visit    None     Visit Diagnoses     Acute bacterial sinusitis    -  Primary    Relevant Medications    doxycycline (VIBRAMYCIN) 100 MG Cap    Cough        Relevant Medications    benzonatate (TESSALON) 100 MG capsule        Advised rest and fluids.  Follow up if worse/persistent.   of 7/12/2019 11:28   7/12/2019 04:20   Sodium 149 (H)   Potassium 3.8   Chloride 116 (H)   Carbon Dioxide 25   Urea Nitrogen 48 (H)   Creatinine 1.72 (H)   GFR Estimate 37 (L)   GFR Estimate If Black 43 (L)   Calcium 7.8 (L)   Anion Gap 8   Albumin 1.8 (L)   Protein Total 4.8 (L)   Bilirubin Total 1.8 (H)   Alkaline Phosphatase 129    (H)   AST 29   Glucose 109 (H)   WBC 10.8   Hemoglobin 14.2   Hematocrit 44.5   Platelet Count 251   INR 1.27 (H)           Imaging:   None new         Assessment and Plan:     Summary:  Bradley Liz IS a 77 year old male admitted on 7/9/2019 for septic shock from gangrenous cholecystitis. He is s/p laparoscopic cholecystectomy. Improving post operatively.      I have personally reviewed the daily labs, imaging studies, cultures      My assessment and plan by system for this patient is as follows:     Neurology/Psychiatry:   1. Sedation / pain.      Plan  Tylenol, dilaudid PRN     Cardiovascular:   1. A fib RVR  2. Shock  3. History of NICM     Plan  -Stop steroids  -Start scheduled IV metoprolol 5q4. If does well can restart oral tonight     Pulmonary/Ventilator Management:   -Extubated. On nasal cannula  -Pulm toilet: Aggressive IS     GI and Nutrition :   1. Cholecystitis. Drain management per surgery. No evidence of bile leak clinically.   Labs normalizing.      Plan  -Clear liquids  -LFTs improving, follow     Renal/Fluids/Electrolytes:   1. Acute Renal failure - suspect at least pre-renal. Likely also ATN. Improving.      Plan  - Monitor UOP, decrease MIVF. Replete lytes PRN     Infectious Disease:   1. Septic shock  2. cholecystitis     Plan  - zosyn until day 3  - await blood culture and OR cultures     Endocrine:   1. No acute issues  Plan  - ICU insulin protocol, goal sugar <180  - Stop hydrocortisone      Hematology/Oncology:   1. Polycythemia - suspect due to hemoconcentration  2. Coagulopathy - due to warfarin.  S/p Kcentra and FFP. Start coumadin.    Plan  - follow CBC and coags    IV/Access:   1. Venous access - CVC  2. Arterial access - art line    Plan  - Remove today if we are able      ICU Prophylaxis:   1. DVT: sq heparin   2. VAP: Not indicated  3. Stress Ulcer: Can stop with starting diet  4. Restraints: Not required  5. Wound care - per unit routine   6. Feeding - Clear liquids  7. Family Update: no  8. Disposition - Stable     Kami Catalan MD, PhD  SICU fellow  [phone]

## 2022-05-12 ENCOUNTER — PATIENT MESSAGE (OUTPATIENT)
Dept: INTERNAL MEDICINE | Facility: CLINIC | Age: 27
End: 2022-05-12
Payer: COMMERCIAL

## 2022-05-12 DIAGNOSIS — B37.31 VAGINAL YEAST INFECTION: Primary | ICD-10-CM

## 2022-05-12 RX ORDER — FLUCONAZOLE 150 MG/1
150 TABLET ORAL DAILY
Qty: 1 TABLET | Refills: 0 | Status: SHIPPED | OUTPATIENT
Start: 2022-05-12 | End: 2022-05-13

## 2022-12-01 ENCOUNTER — OFFICE VISIT (OUTPATIENT)
Dept: INTERNAL MEDICINE | Facility: CLINIC | Age: 27
End: 2022-12-01
Payer: COMMERCIAL

## 2022-12-01 DIAGNOSIS — J06.9 VIRAL URI WITH COUGH: Primary | ICD-10-CM

## 2022-12-01 PROCEDURE — 1159F PR MEDICATION LIST DOCUMENTED IN MEDICAL RECORD: ICD-10-PCS | Mod: CPTII,95,, | Performed by: FAMILY MEDICINE

## 2022-12-01 PROCEDURE — 1159F MED LIST DOCD IN RCRD: CPT | Mod: CPTII,95,, | Performed by: FAMILY MEDICINE

## 2022-12-01 PROCEDURE — 1160F PR REVIEW ALL MEDS BY PRESCRIBER/CLIN PHARMACIST DOCUMENTED: ICD-10-PCS | Mod: CPTII,95,, | Performed by: FAMILY MEDICINE

## 2022-12-01 PROCEDURE — 1160F RVW MEDS BY RX/DR IN RCRD: CPT | Mod: CPTII,95,, | Performed by: FAMILY MEDICINE

## 2022-12-01 PROCEDURE — 99213 PR OFFICE/OUTPT VISIT, EST, LEVL III, 20-29 MIN: ICD-10-PCS | Mod: 95,,, | Performed by: FAMILY MEDICINE

## 2022-12-01 PROCEDURE — 99213 OFFICE O/P EST LOW 20 MIN: CPT | Mod: 95,,, | Performed by: FAMILY MEDICINE

## 2022-12-01 RX ORDER — BENZONATATE 100 MG/1
100-200 CAPSULE ORAL 3 TIMES DAILY PRN
Qty: 30 CAPSULE | Refills: 1 | Status: SHIPPED | OUTPATIENT
Start: 2022-12-01 | End: 2022-12-11

## 2022-12-01 NOTE — PROGRESS NOTES
Subjective:       Patient ID: Catherine Ordaz is a 27 y.o. female.    Chief Complaint: feeling sick    The patient location is: home  The chief complaint leading to consultation is: sore throat    Visit type: audiovisual    Face to Face time with patient: 4 minutes (chart review started 12:28 pm; video started 12:30 pm; video ended 12:34 pm)  9 minutes of total time spent on the encounter, which includes face to face time and non-face to face time preparing to see the patient (eg, review of tests), Obtaining and/or reviewing separately obtained history, Documenting clinical information in the electronic or other health record, Independently interpreting results (not separately reported) and communicating results to the patient/family/caregiver, or Care coordination (not separately reported).     Each patient to whom he or she provides medical services by telemedicine is:  (1) informed of the relationship between the physician and patient and the respective role of any other health care provider with respect to management of the patient; and (2) notified that he or she may decline to receive medical services by telemedicine and may withdraw from such care at any time.    Virtual visit for URI symptoms x 3 days. Reports congestion and some greenish nasal drainage when she blows her nose. Taking mucinex cold and OTC cough medication with some relief. Desires cough medication. Patient is otherwise without concerns today.    Sore Throat   This is a new problem. The current episode started yesterday. The problem has been waxing and waning. Neither side of throat is experiencing more pain than the other. There has been no fever. The pain is at a severity of 6/10. The pain is moderate. Associated symptoms include congestion, coughing, diarrhea, headaches, a hoarse voice, a plugged ear sensation, neck pain, stridor, swollen glands and trouble swallowing. Pertinent negatives include no abdominal pain, drooling, ear  discharge, ear pain, shortness of breath or vomiting. She has had no exposure to strep or mono. She has tried NSAIDs, acetaminophen and cool liquids for the symptoms. The treatment provided no relief.   Review of Systems   HENT:  Positive for congestion, hoarse voice, sore throat and trouble swallowing. Negative for drooling, ear discharge and ear pain.    Respiratory:  Positive for cough and stridor. Negative for shortness of breath.    Gastrointestinal:  Positive for diarrhea. Negative for abdominal pain and vomiting.   Musculoskeletal:  Positive for neck pain.   Neurological:  Positive for headaches.       Objective:      Physical Exam  Constitutional:       General: She is not in acute distress.     Appearance: She is well-developed.   HENT:      Head: Normocephalic and atraumatic.   Eyes:      General: Lids are normal. No scleral icterus.     Extraocular Movements: Extraocular movements intact.      Conjunctiva/sclera: Conjunctivae normal.   Pulmonary:      Effort: Pulmonary effort is normal.      Comments: Able to speak in complete sentences without difficulty.  Neurological:      Mental Status: She is alert and oriented to person, place, and time.   Psychiatric:         Mood and Affect: Mood and affect normal.       Assessment:       1. Viral URI with cough        Plan:   1. Viral URI with cough  -     benzonatate (TESSALON) 100 MG capsule; Take 1-2 capsules (100-200 mg total) by mouth 3 (three) times daily as needed for Cough.  Dispense: 30 capsule; Refill: 1    Advised rest and fluids. Continue OTC cough/cold medications, check labels to avoid overdosing ingredients. Tessalon perles as above. Isolate until feeling better x 24 hours. Encouraged home covid test, if positive isolate per CDC guidelines. Follow up if worse/persistent. Patient expressed understanding and agreement with plan.

## 2023-03-10 ENCOUNTER — PATIENT MESSAGE (OUTPATIENT)
Dept: INTERNAL MEDICINE | Facility: CLINIC | Age: 28
End: 2023-03-10
Payer: COMMERCIAL

## 2024-01-02 ENCOUNTER — E-VISIT (OUTPATIENT)
Dept: FAMILY MEDICINE | Facility: CLINIC | Age: 29
End: 2024-01-02
Payer: COMMERCIAL

## 2024-01-02 DIAGNOSIS — N39.0 URINARY TRACT INFECTION WITHOUT HEMATURIA, SITE UNSPECIFIED: Primary | ICD-10-CM

## 2024-01-02 DIAGNOSIS — J32.9 SINUSITIS, UNSPECIFIED CHRONICITY, UNSPECIFIED LOCATION: ICD-10-CM

## 2024-01-02 PROCEDURE — 99421 OL DIG E/M SVC 5-10 MIN: CPT | Mod: ,,, | Performed by: STUDENT IN AN ORGANIZED HEALTH CARE EDUCATION/TRAINING PROGRAM

## 2024-01-02 RX ORDER — CEFDINIR 300 MG/1
300 CAPSULE ORAL 2 TIMES DAILY
Qty: 14 CAPSULE | Refills: 0 | Status: SHIPPED | OUTPATIENT
Start: 2024-01-02 | End: 2024-01-09

## 2024-01-02 RX ORDER — NITROFURANTOIN 25; 75 MG/1; MG/1
100 CAPSULE ORAL 2 TIMES DAILY
Qty: 10 CAPSULE | Refills: 0 | Status: SHIPPED | OUTPATIENT
Start: 2024-01-02 | End: 2024-01-02

## 2024-01-02 RX ORDER — DESLORATADINE 5 MG/1
5 TABLET ORAL DAILY
Qty: 7 TABLET | Refills: 0 | Status: SHIPPED | OUTPATIENT
Start: 2024-01-02 | End: 2024-01-24

## 2024-01-02 NOTE — PROGRESS NOTES
Patient ID: Catherine Ordaz is a 28 y.o. female.    Chief Complaint: Urinary Tract Infection (Entered automatically based on patient selection in Patient Portal.)          274}  The patient initiated a request through [employer] on 1/2/2024 for evaluation and management with a chief complaint of Urinary Tract Infection (Entered automatically based on patient selection in Patient Portal.)     I evaluated the questionnaire submission on 01/02/2024 .    [facility] Peq Evisit Uti Questionnaire    1/2/2024  1:25 PM CST - Filed by Patient   Do you agree to participate in an E-Visit? Yes   If you have any of the following problems, please present to your local ER or call 911:  I acknowledge   What is the main issue that you would like for your doctor to address today? Antibiotics.   Are you able to take your vital signs? No   Are you currently pregnant, could you be pregnant, or are you breast feeding? None of the above   What symptoms do you currently have? Pain while passing urine;  Difficulty passing urine;  Change in urine appearance or smell   When did your symptoms first appear? 1/1/2024   List what you have done or taken to help your symptoms. Azo. Drink water.   Please indicate whether you have had the following symptoms during the past 24 hours     Urgent urination (a sudden and uncontrollable urge to urinate) Moderate   Frequent urination of small amounts of urine (going to the toilet very often) Moderate   Burning pain when urinating Moderate   Incomplete bladder emptying (still feel like you need to urinate again after urination) Moderate   Pain not associated with urination in the lower abdomen below the belly button) None   What does your urine look like? I am not sure   Blood seen in the urine None   Flank pain (pain in one or both sides of the lower back) Mild   Abnormal Vaginal Discharge (abnormal amount, color and/or odor) Mild   Discharge from the urethra (urinary opening) without urination Mild   High body  temperature/fever? Yes, mild-99.6 F-100.2 F   Please rate how much discomfort you have experience because of the symptoms in the past 24 hours: Severe   Please indicate how the symptoms have interfered with your every day activities/work in the past 24 hours: Severe   Please indicate how these symptoms have interfered with your social activities (visiting people, meeting with friends, etc.) in the past 24 hours? Severe   Are you a diabetic? No   Please indicate whether you have the following at the time of completion of this questionnaire: None of the above   Provide any information you feel is important to your history not asked above    Please attach any relevant images or files (if you have performed a home test for UTI, please submit a photo of results)           Active Problem List with Overview Notes    Diagnosis Date Noted    Insomnia secondary to anxiety 10/03/2018      Recent Labs Obtained:  Lab Results   Component Value Date    WBC 5.44 10/03/2018    HGB 13.6 10/03/2018    HCT 41.0 10/03/2018    MCV 96 10/03/2018     10/03/2018     10/03/2018    K 4.7 10/03/2018    GLU 60 (L) 10/03/2018    CREATININE 0.8 10/03/2018      Review of patient's allergies indicates:   Allergen Reactions    Aspirin Anxiety, Nausea Only and Shortness Of Breath       Encounter Diagnoses   Name Primary?    Urinary tract infection without hematuria, site unspecified Yes    Sinusitis, unspecified chronicity, unspecified location         No orders of the defined types were placed in this encounter.     Medications Ordered This Encounter   Medications    cefdinir (OMNICEF) 300 MG capsule     Sig: Take 1 capsule (300 mg total) by mouth 2 (two) times daily. for 7 days     Dispense:  14 capsule     Refill:  0     Pt has sinusitis thus will treat with cefdinir over macrobid to treat UTI as well    desloratadine (CLARINEX) 5 mg tablet     Sig: Take 1 tablet (5 mg total) by mouth once daily. for 7 days     Dispense:  7 tablet      Refill:  0        E-Visit Time Tracking:    Day 1 Time (in minutes): 11     Total Time (in minutes): 11    This is the extent of this pleasant patient's concerns at this present time. She did not feel chest pain upon exertion. No unilateral leg swelling, calf tenderness, or calf pain.    274}

## 2024-01-05 ENCOUNTER — PATIENT OUTREACH (OUTPATIENT)
Dept: ADMINISTRATIVE | Facility: HOSPITAL | Age: 29
End: 2024-01-05
Payer: COMMERCIAL

## 2024-01-05 NOTE — PROGRESS NOTES
Working not seen in 12 months.    [facility]-no results found.  [facility]- no results found.  [facility]-no results found.    LM offering to schedule annual exam.

## 2024-02-12 ENCOUNTER — PATIENT OUTREACH (OUTPATIENT)
Dept: ADMINISTRATIVE | Facility: HOSPITAL | Age: 29
End: 2024-02-12
Payer: COMMERCIAL

## 2024-02-12 NOTE — PROGRESS NOTES
Working not seen in 12 months.    LM offering to schedule annual exam.       Purse String (Simple) Text: Given the location of the defect and the characteristics of the surrounding skin a purse string closure was deemed most appropriate.  Undermining was performed circumfirentially around the surgical defect.  A purse string suture was then placed and tightened.

## 2024-02-14 ENCOUNTER — OFFICE VISIT (OUTPATIENT)
Dept: INTERNAL MEDICINE | Facility: CLINIC | Age: 29
End: 2024-02-14
Payer: COMMERCIAL

## 2024-02-14 VITALS
OXYGEN SATURATION: 99 % | WEIGHT: 135.56 LBS | RESPIRATION RATE: 18 BRPM | SYSTOLIC BLOOD PRESSURE: 120 MMHG | BODY MASS INDEX: 23.14 KG/M2 | DIASTOLIC BLOOD PRESSURE: 74 MMHG | HEART RATE: 98 BPM | TEMPERATURE: 98 F | HEIGHT: 64 IN

## 2024-02-14 DIAGNOSIS — Z11.4 SCREENING FOR HIV (HUMAN IMMUNODEFICIENCY VIRUS): ICD-10-CM

## 2024-02-14 DIAGNOSIS — Z13.220 SCREENING FOR LIPOID DISORDERS: ICD-10-CM

## 2024-02-14 DIAGNOSIS — Z00.00 ROUTINE GENERAL MEDICAL EXAMINATION AT A HEALTH CARE FACILITY: Primary | ICD-10-CM

## 2024-02-14 DIAGNOSIS — Z13.29 THYROID DISORDER SCREEN: ICD-10-CM

## 2024-02-14 DIAGNOSIS — Z72.0 CURRENT EVERY DAY NICOTINE VAPING: ICD-10-CM

## 2024-02-14 DIAGNOSIS — Z11.59 NEED FOR HEPATITIS C SCREENING TEST: ICD-10-CM

## 2024-02-14 DIAGNOSIS — Z23 NEED FOR TDAP VACCINATION: ICD-10-CM

## 2024-02-14 PROCEDURE — 1159F MED LIST DOCD IN RCRD: CPT | Mod: CPTII,S$GLB,, | Performed by: FAMILY MEDICINE

## 2024-02-14 PROCEDURE — 99999 PR PBB SHADOW E&M-EST. PATIENT-LVL III: CPT | Mod: PBBFAC,,, | Performed by: FAMILY MEDICINE

## 2024-02-14 PROCEDURE — 3078F DIAST BP <80 MM HG: CPT | Mod: CPTII,S$GLB,, | Performed by: FAMILY MEDICINE

## 2024-02-14 PROCEDURE — 3008F BODY MASS INDEX DOCD: CPT | Mod: CPTII,S$GLB,, | Performed by: FAMILY MEDICINE

## 2024-02-14 PROCEDURE — 90471 IMMUNIZATION ADMIN: CPT | Mod: S$GLB,,, | Performed by: FAMILY MEDICINE

## 2024-02-14 PROCEDURE — 90715 TDAP VACCINE 7 YRS/> IM: CPT | Mod: S$GLB,,, | Performed by: FAMILY MEDICINE

## 2024-02-14 PROCEDURE — 99395 PREV VISIT EST AGE 18-39: CPT | Mod: 25,S$GLB,, | Performed by: FAMILY MEDICINE

## 2024-02-14 PROCEDURE — 3074F SYST BP LT 130 MM HG: CPT | Mod: CPTII,S$GLB,, | Performed by: FAMILY MEDICINE

## 2024-02-14 NOTE — PROGRESS NOTES
Subjective:       Patient ID: Catherine Ordaz is a 28 y.o. female.    Chief Complaint: Annual Exam and Diarrhea    Patient presents to clinic today for annual physical exam.      Review of Systems   Constitutional:  Positive for fatigue. Negative for chills, fever and unexpected weight change.   HENT:  Negative for congestion, dental problem, ear pain, hearing loss, rhinorrhea and trouble swallowing.    Eyes:  Negative for pain and visual disturbance.   Respiratory:  Negative for cough and shortness of breath.    Cardiovascular:  Negative for chest pain, palpitations and leg swelling.   Gastrointestinal:  Positive for constipation, diarrhea and nausea. Negative for abdominal distention, abdominal pain, blood in stool and vomiting.        Reports intermittent diarrhea x 3 weeks, + increased stress   Genitourinary:  Negative for difficulty urinating and vaginal discharge.   Musculoskeletal:  Positive for myalgias. Negative for arthralgias.   Skin:  Negative for rash.   Neurological:  Negative for dizziness, weakness, numbness and headaches.   Hematological:  Negative for adenopathy. Does not bruise/bleed easily.   Psychiatric/Behavioral:  Positive for dysphoric mood and sleep disturbance. The patient is nervous/anxious.          Objective:      Physical Exam  Vitals reviewed.   Constitutional:       General: She is not in acute distress.     Appearance: Normal appearance. She is well-developed.   HENT:      Head: Normocephalic and atraumatic.      Right Ear: Tympanic membrane, ear canal and external ear normal.      Left Ear: Tympanic membrane, ear canal and external ear normal.      Nose: Nose normal. No mucosal edema or rhinorrhea.      Mouth/Throat:      Pharynx: Uvula midline.   Eyes:      General: Lids are normal. No scleral icterus.     Extraocular Movements: Extraocular movements intact.      Conjunctiva/sclera: Conjunctivae normal.      Pupils: Pupils are equal, round, and reactive to light.   Neck:       Thyroid: No thyromegaly.   Cardiovascular:      Rate and Rhythm: Normal rate and regular rhythm.      Heart sounds: No murmur heard.     No friction rub. No gallop.   Pulmonary:      Effort: Pulmonary effort is normal.      Breath sounds: Normal breath sounds. No wheezing, rhonchi or rales.   Abdominal:      General: Bowel sounds are normal. There is no distension.      Palpations: Abdomen is soft. There is no mass.      Tenderness: There is no abdominal tenderness.   Musculoskeletal:         General: Normal range of motion.      Cervical back: Normal range of motion and neck supple.   Lymphadenopathy:      Cervical: No cervical adenopathy.   Skin:     General: Skin is warm and dry.      Findings: No lesion or rash.      Nails: There is no clubbing.   Neurological:      Mental Status: She is alert and oriented to person, place, and time.      Cranial Nerves: No cranial nerve deficit.      Sensory: No sensory deficit.      Gait: Gait normal.   Psychiatric:         Mood and Affect: Mood and affect normal.         Assessment:       1. Routine general medical examination at a health care facility    2. Current every day nicotine vaping    3. Screening for lipoid disorders    4. Thyroid disorder screen    5. Need for hepatitis C screening test    6. Screening for HIV (human immunodeficiency virus)    7. Need for Tdap vaccination        Plan:   1. Routine general medical examination at a health care facility  -     Comprehensive Metabolic Panel; Future; Expected date: 02/14/2024  -     CBC Auto Differential; Future; Expected date: 02/14/2024    2. Current every day nicotine vaping  Assessment & Plan:  Encouraged cessation      3. Screening for lipoid disorders  -     Lipid Panel; Future; Expected date: 02/14/2024    4. Thyroid disorder screen  -     TSH; Future; Expected date: 02/14/2024    5. Need for hepatitis C screening test  -     Hepatitis C Antibody; Future; Expected date: 02/14/2024    6. Screening for HIV (human  immunodeficiency virus)  -     HIV 1/2 Ag/Ab (4th Gen); Future; Expected date: 02/14/2024    7. Need for Tdap vaccination  -     Tdap Vaccine      Discussed stress management. Follow up if symptoms worsen/persist.  Health Maintenance reviewed/updated.

## 2024-02-15 ENCOUNTER — LAB VISIT (OUTPATIENT)
Dept: LAB | Facility: HOSPITAL | Age: 29
End: 2024-02-15
Attending: FAMILY MEDICINE
Payer: COMMERCIAL

## 2024-02-15 DIAGNOSIS — Z11.59 NEED FOR HEPATITIS C SCREENING TEST: ICD-10-CM

## 2024-02-15 DIAGNOSIS — Z11.4 SCREENING FOR HIV (HUMAN IMMUNODEFICIENCY VIRUS): ICD-10-CM

## 2024-02-15 DIAGNOSIS — Z13.29 THYROID DISORDER SCREEN: ICD-10-CM

## 2024-02-15 DIAGNOSIS — Z00.00 ROUTINE GENERAL MEDICAL EXAMINATION AT A HEALTH CARE FACILITY: ICD-10-CM

## 2024-02-15 DIAGNOSIS — Z13.220 SCREENING FOR LIPOID DISORDERS: ICD-10-CM

## 2024-02-15 LAB
ALBUMIN SERPL BCP-MCNC: 4.1 G/DL (ref 3.5–5.2)
ALP SERPL-CCNC: 60 U/L (ref 55–135)
ALT SERPL W/O P-5'-P-CCNC: 11 U/L (ref 10–44)
ANION GAP SERPL CALC-SCNC: 11 MMOL/L (ref 8–16)
AST SERPL-CCNC: 15 U/L (ref 10–40)
BILIRUB SERPL-MCNC: 0.6 MG/DL (ref 0.1–1)
BUN SERPL-MCNC: 16 MG/DL (ref 6–20)
CALCIUM SERPL-MCNC: 9.4 MG/DL (ref 8.7–10.5)
CHLORIDE SERPL-SCNC: 107 MMOL/L (ref 95–110)
CHOLEST SERPL-MCNC: 181 MG/DL (ref 120–199)
CHOLEST/HDLC SERPL: 2.7 {RATIO} (ref 2–5)
CO2 SERPL-SCNC: 22 MMOL/L (ref 23–29)
CREAT SERPL-MCNC: 0.8 MG/DL (ref 0.5–1.4)
EST. GFR  (NO RACE VARIABLE): >60 ML/MIN/1.73 M^2
GLUCOSE SERPL-MCNC: 74 MG/DL (ref 70–110)
HDLC SERPL-MCNC: 68 MG/DL (ref 40–75)
HDLC SERPL: 37.6 % (ref 20–50)
LDLC SERPL CALC-MCNC: 93.8 MG/DL (ref 63–159)
NONHDLC SERPL-MCNC: 113 MG/DL
POTASSIUM SERPL-SCNC: 4.5 MMOL/L (ref 3.5–5.1)
PROT SERPL-MCNC: 6.9 G/DL (ref 6–8.4)
SODIUM SERPL-SCNC: 140 MMOL/L (ref 136–145)
TRIGL SERPL-MCNC: 96 MG/DL (ref 30–150)
TSH SERPL DL<=0.005 MIU/L-ACNC: 0.8 UIU/ML (ref 0.4–4)

## 2024-02-15 PROCEDURE — 36415 COLL VENOUS BLD VENIPUNCTURE: CPT | Performed by: FAMILY MEDICINE

## 2024-02-15 PROCEDURE — 85025 COMPLETE CBC W/AUTO DIFF WBC: CPT | Performed by: FAMILY MEDICINE

## 2024-02-15 PROCEDURE — 86803 HEPATITIS C AB TEST: CPT | Performed by: FAMILY MEDICINE

## 2024-02-15 PROCEDURE — 84443 ASSAY THYROID STIM HORMONE: CPT | Performed by: FAMILY MEDICINE

## 2024-02-15 PROCEDURE — 87389 HIV-1 AG W/HIV-1&-2 AB AG IA: CPT | Performed by: FAMILY MEDICINE

## 2024-02-15 PROCEDURE — 80053 COMPREHEN METABOLIC PANEL: CPT | Performed by: FAMILY MEDICINE

## 2024-02-15 PROCEDURE — 80061 LIPID PANEL: CPT | Performed by: FAMILY MEDICINE

## 2024-02-16 LAB
BASOPHILS # BLD AUTO: 0.03 K/UL (ref 0–0.2)
BASOPHILS NFR BLD: 0.6 % (ref 0–1.9)
DIFFERENTIAL METHOD BLD: ABNORMAL
EOSINOPHIL # BLD AUTO: 0 K/UL (ref 0–0.5)
EOSINOPHIL NFR BLD: 0.6 % (ref 0–8)
ERYTHROCYTE [DISTWIDTH] IN BLOOD BY AUTOMATED COUNT: 12.2 % (ref 11.5–14.5)
HCT VFR BLD AUTO: 38.7 % (ref 37–48.5)
HCV AB SERPL QL IA: NORMAL
HGB BLD-MCNC: 12.5 G/DL (ref 12–16)
HIV 1+2 AB+HIV1 P24 AG SERPL QL IA: NORMAL
IMM GRANULOCYTES # BLD AUTO: 0.01 K/UL (ref 0–0.04)
IMM GRANULOCYTES NFR BLD AUTO: 0.2 % (ref 0–0.5)
LYMPHOCYTES # BLD AUTO: 1.3 K/UL (ref 1–4.8)
LYMPHOCYTES NFR BLD: 27.6 % (ref 18–48)
MCH RBC QN AUTO: 32.6 PG (ref 27–31)
MCHC RBC AUTO-ENTMCNC: 32.3 G/DL (ref 32–36)
MCV RBC AUTO: 101 FL (ref 82–98)
MONOCYTES # BLD AUTO: 0.3 K/UL (ref 0.3–1)
MONOCYTES NFR BLD: 5.6 % (ref 4–15)
NEUTROPHILS # BLD AUTO: 3 K/UL (ref 1.8–7.7)
NEUTROPHILS NFR BLD: 65.4 % (ref 38–73)
NRBC BLD-RTO: 0 /100 WBC
PLATELET # BLD AUTO: 219 K/UL (ref 150–450)
PMV BLD AUTO: 12.4 FL (ref 9.2–12.9)
RBC # BLD AUTO: 3.83 M/UL (ref 4–5.4)
WBC # BLD AUTO: 4.64 K/UL (ref 3.9–12.7)

## 2024-03-18 ENCOUNTER — OFFICE VISIT (OUTPATIENT)
Dept: INTERNAL MEDICINE | Facility: CLINIC | Age: 29
End: 2024-03-18
Payer: COMMERCIAL

## 2024-03-18 VITALS — DIASTOLIC BLOOD PRESSURE: 70 MMHG | SYSTOLIC BLOOD PRESSURE: 138 MMHG

## 2024-03-18 DIAGNOSIS — N30.00 ACUTE CYSTITIS WITHOUT HEMATURIA: Primary | ICD-10-CM

## 2024-03-18 PROCEDURE — 1160F RVW MEDS BY RX/DR IN RCRD: CPT | Mod: CPTII,95,, | Performed by: FAMILY MEDICINE

## 2024-03-18 PROCEDURE — 3078F DIAST BP <80 MM HG: CPT | Mod: CPTII,95,, | Performed by: FAMILY MEDICINE

## 2024-03-18 PROCEDURE — 1159F MED LIST DOCD IN RCRD: CPT | Mod: CPTII,95,, | Performed by: FAMILY MEDICINE

## 2024-03-18 PROCEDURE — 3075F SYST BP GE 130 - 139MM HG: CPT | Mod: CPTII,95,, | Performed by: FAMILY MEDICINE

## 2024-03-18 PROCEDURE — 99214 OFFICE O/P EST MOD 30 MIN: CPT | Mod: 95,,, | Performed by: FAMILY MEDICINE

## 2024-03-18 RX ORDER — PHENAZOPYRIDINE HYDROCHLORIDE 100 MG/1
100 TABLET, FILM COATED ORAL 3 TIMES DAILY PRN
Qty: 10 TABLET | Refills: 0 | Status: SHIPPED | OUTPATIENT
Start: 2024-03-18 | End: 2024-03-28

## 2024-03-18 RX ORDER — NITROFURANTOIN 25; 75 MG/1; MG/1
100 CAPSULE ORAL 2 TIMES DAILY
Qty: 10 CAPSULE | Refills: 0 | Status: SHIPPED | OUTPATIENT
Start: 2024-03-18 | End: 2024-03-23

## 2024-03-18 NOTE — PROGRESS NOTES
Subjective:       Patient ID: Catherine Ordaz is a 28 y.o. female.    Chief Complaint: Urinary Tract Infection    The patient location is: home  The chief complaint leading to consultation is: UTI    Visit type: audiovisual    Face to Face time with patient: 4 minutes (chart review started 12:39 pm; video started 12:41 pm; video ended 12:45 pm)  8 minutes of total time spent on the encounter, which includes face to face time and non-face to face time preparing to see the patient (eg, review of tests), Obtaining and/or reviewing separately obtained history, Documenting clinical information in the electronic or other health record, Independently interpreting results (not separately reported) and communicating results to the patient/family/caregiver, or Care coordination (not separately reported).     Each patient to whom he or she provides medical services by telemedicine is:  (1) informed of the relationship between the physician and patient and the respective role of any other health care provider with respect to management of the patient; and (2) notified that he or she may decline to receive medical services by telemedicine and may withdraw from such care at any time.    History of Present Illness    Virtual visit for urinary discomfort. She reports experiencing pain during urination starting yesterday morning. Along with the discomfort, she has reported feelings of nausea and aches in the lower abdomen. She has been managing symptoms with OTC AZOs, reporting no additional symptoms such as fever, chills, or back pain. No visible blood in urine has been noted by the patient.       Dysuria   The current episode started yesterday. The problem occurs intermittently. The quality of the pain is described as aching and burning. The pain is at a severity of 9/10. The pain is severe. There has been no fever. The fever has been present for Less than 1 day. She is Sexually active. There is No history of pyelonephritis.  Associated symptoms include hesitancy, nausea and sweats. Pertinent negatives include no behavior changes, chills, discharge, flank pain, frequency, hematuria, possible pregnancy, urgency, vomiting, weight loss, constipation, rash or withholding. She has tried increased fluids for the symptoms. The treatment provided mild relief.     Review of Systems   Constitutional:  Negative for chills.   Gastrointestinal:  Positive for nausea. Negative for constipation and vomiting.   Genitourinary:  Positive for dysuria. Negative for flank pain, frequency, hematuria and urgency.   Skin:  Negative for rash.       Objective:      Physical Exam  Constitutional:       General: She is not in acute distress.     Appearance: She is well-developed.   HENT:      Head: Normocephalic and atraumatic.   Eyes:      General: Lids are normal. No scleral icterus.     Extraocular Movements: Extraocular movements intact.      Conjunctiva/sclera: Conjunctivae normal.   Pulmonary:      Effort: Pulmonary effort is normal.   Neurological:      Mental Status: She is alert and oriented to person, place, and time.   Psychiatric:         Mood and Affect: Mood and affect normal.       Assessment:       1. Acute cystitis without hematuria        Plan:   1. Acute cystitis without hematuria  -     nitrofurantoin, macrocrystal-monohydrate, (MACROBID) 100 MG capsule; Take 1 capsule (100 mg total) by mouth 2 (two) times daily. for 5 days  Dispense: 10 capsule; Refill: 0  -     phenazopyridine (PYRIDIUM) 100 MG tablet; Take 1 tablet (100 mg total) by mouth 3 (three) times daily as needed for Pain.  Dispense: 10 tablet; Refill: 0     Prescribed a five-day course of Macrobid to treat the patient's suspected UTI.   Added Pyridium to the regimen to alleviate the associated burning and discomfort.   Informed the patient about the potential side effects of Pyridium, including the possibility of it turning her urine and contact lenses orange.   Advised the patient to  consume ample fluids and consider cranberry juice or supplements.   Instructed the patient to return if symptoms worsen or do not improve.      Patient expressed understanding and agreement with plan.    Follow up if symptoms worsen or fail to improve, for keep routine follow up.    This note was generated with the assistance of ambient listening technology. Verbal consent was obtained by the patient and accompanying visitor(s) for the recording of patient appointment to facilitate this note. I attest to having reviewed and edited the generated note for accuracy, though some syntax or spelling errors may persist. Please contact the author of this note for any clarification.

## 2024-11-08 ENCOUNTER — E-VISIT (OUTPATIENT)
Dept: FAMILY MEDICINE | Facility: CLINIC | Age: 29
End: 2024-11-08
Payer: COMMERCIAL

## 2024-11-08 DIAGNOSIS — R30.0 DYSURIA: Primary | ICD-10-CM

## 2024-11-08 RX ORDER — NITROFURANTOIN 25; 75 MG/1; MG/1
100 CAPSULE ORAL 2 TIMES DAILY
Qty: 10 CAPSULE | Refills: 0 | Status: SHIPPED | OUTPATIENT
Start: 2024-11-08 | End: 2024-11-13

## 2024-11-08 NOTE — PROGRESS NOTES
Patient ID: Catherine Ordaz is a 29 y.o. female.    Chief Complaint: General Illness (Entered automatically based on patient selection in [facility].)    The patient initiated a request through [employer] on 11/8/2024 for evaluation and management with a chief complaint of General Illness (Entered automatically based on patient selection in [employer].)     I evaluated the questionnaire submission on 11/8/2024.    [facility]    11/8/2024 11:00 AM CST - Filed by Patient   What do you need help with? Urinary Symptoms   Do you agree to participate in an E-Visit? Yes   If you have any of the following symptoms, please present to your local emergency room or call 911:  I acknowledge   Select all that apply: None of the above   What is the main issue you would like addressed today? I keep getting utis often and i am taking cranberry supplements, drinking water, using ph wash. Is there anything i can take regularly besides cranberry tablets that will work? These are so painful! I may need antobiotics and some type of relief please   What symptoms do you currently have? Pain while passing urine;  Difficulty passing urine;  Change in urine appearance or smell   When did your symptoms first appear? 11/3/2024   List what you have done or taken to help your symptoms. Azo cranberry urinary tract health, cystyx for uti   Please indicate whether you have had the following symptoms during the past 24 hours     Urgent urination (a sudden and uncontrollable urge to urinate) Moderate   Frequent urination of small amounts of urine (going to the toilet very often) Severe   Burning pain when urinating Mild   Incomplete bladder emptying (still feel like you need to urinate again after urination) Severe   Pain not associated with urination in the lower abdomen below the belly button) Mild   What does your urine look like? Cloudy   Blood seen in the urine None   Flank pain (pain in one or both sides of the lower  back) Moderate   Abnormal Vaginal Discharge (abnormal amount, color and/or odor) Mild   Discharge from the urethra (urinary opening) without urination Mild   High body temperature/fever? Yes, mild-99.6 F-100.2 F   Please rate how much discomfort you have experience because of the symptoms in the past 24 hours: Severe   Please indicate how the symptoms have interfered with your every day activities/work in the past 24 hours: Severe   Please indicate how these symptoms have interfered with your social activities (visiting people, meeting with friends, etc.) in the past 24 hours? Severe   Are you a diabetic? No   Please indicate whether you have the following at the time of completion of this questionnaire: None of the above   Provide any additional information you feel is important. I get these all the time now and josé antonio what else to do. Please help.   Please attach any relevant images or files (if you have performed a home test for UTI, please submit a photo of results)    Are you able to take your vital signs? No         Encounter Diagnosis   Name Primary?    Dysuria Yes        No orders of the defined types were placed in this encounter.     Medications Ordered This Encounter   Medications    nitrofurantoin, macrocrystal-monohydrate, (MACROBID) 100 MG capsule     Sig: Take 1 capsule (100 mg total) by mouth 2 (two) times daily. for 5 days     Dispense:  10 capsule     Refill:  0        No follow-ups on file.      E-Visit Time Tracking:    Day 1 Time (in minutes): 5    Total Time (in minutes): 5